# Patient Record
Sex: FEMALE | Race: WHITE | NOT HISPANIC OR LATINO | URBAN - METROPOLITAN AREA
[De-identification: names, ages, dates, MRNs, and addresses within clinical notes are randomized per-mention and may not be internally consistent; named-entity substitution may affect disease eponyms.]

---

## 2023-05-08 ENCOUNTER — TELEPHONE (OUTPATIENT)
Dept: PRIMARY CARE | Facility: CLINIC | Age: 53
End: 2023-05-08

## 2023-05-08 DIAGNOSIS — J32.9 SINUSITIS, UNSPECIFIED CHRONICITY, UNSPECIFIED LOCATION: ICD-10-CM

## 2023-05-08 RX ORDER — DICYCLOMINE HYDROCHLORIDE 20 MG/1
1 TABLET ORAL EVERY 6 HOURS
COMMUNITY
Start: 2021-09-16 | End: 2024-03-07 | Stop reason: ALTCHOICE

## 2023-05-08 RX ORDER — VALACYCLOVIR HYDROCHLORIDE 500 MG/1
1 TABLET, FILM COATED ORAL DAILY
COMMUNITY
Start: 2016-03-10

## 2023-05-08 RX ORDER — ESCITALOPRAM OXALATE 20 MG/1
1 TABLET ORAL DAILY
COMMUNITY
Start: 2020-10-27 | End: 2023-11-09 | Stop reason: SDUPTHER

## 2023-05-08 RX ORDER — LISINOPRIL 20 MG/1
TABLET ORAL
COMMUNITY
Start: 2017-02-11 | End: 2023-11-09 | Stop reason: SDUPTHER

## 2023-05-08 RX ORDER — METOPROLOL SUCCINATE 100 MG/1
1 TABLET, EXTENDED RELEASE ORAL DAILY
COMMUNITY
Start: 2018-10-12 | End: 2023-11-09 | Stop reason: SDUPTHER

## 2023-05-08 NOTE — TELEPHONE ENCOUNTER
Pt called to state that she has started a new job and has an sinus infection and at this time has not insurance, per pt discolored drainage, productive cough and is asking if something can be called in.    DA    [facility] [phone]      Per Dr Kacy lobo to call in rx for Doxycycline Hyclate 100 mg 1 capsule 2 times a day x 10 #20 no refill, rx called into the doctor line at [facility] and pt notified

## 2023-05-09 RX ORDER — DOXYCYCLINE 100 MG/1
100 CAPSULE ORAL 2 TIMES DAILY
Qty: 20 CAPSULE | Refills: 0 | OUTPATIENT
Start: 2023-05-09 | End: 2023-05-19

## 2023-11-09 ENCOUNTER — OFFICE VISIT (OUTPATIENT)
Dept: PRIMARY CARE | Facility: CLINIC | Age: 53
End: 2023-11-09
Payer: COMMERCIAL

## 2023-11-09 ENCOUNTER — LAB (OUTPATIENT)
Dept: LAB | Facility: LAB | Age: 53
End: 2023-11-09
Payer: COMMERCIAL

## 2023-11-09 VITALS
DIASTOLIC BLOOD PRESSURE: 100 MMHG | WEIGHT: 190 LBS | OXYGEN SATURATION: 96 % | TEMPERATURE: 97.8 F | BODY MASS INDEX: 29.32 KG/M2 | HEART RATE: 75 BPM | SYSTOLIC BLOOD PRESSURE: 180 MMHG

## 2023-11-09 DIAGNOSIS — I10 PRIMARY HYPERTENSION: Primary | ICD-10-CM

## 2023-11-09 DIAGNOSIS — I10 PRIMARY HYPERTENSION: ICD-10-CM

## 2023-11-09 LAB
25(OH)D3 SERPL-MCNC: 24 NG/ML (ref 30–100)
ALBUMIN SERPL BCP-MCNC: 4.5 G/DL (ref 3.4–5)
ALP SERPL-CCNC: 57 U/L (ref 33–110)
ALT SERPL W P-5'-P-CCNC: 20 U/L (ref 7–45)
ANION GAP SERPL CALC-SCNC: 12 MMOL/L (ref 10–20)
AST SERPL W P-5'-P-CCNC: 31 U/L (ref 9–39)
BILIRUB SERPL-MCNC: 0.6 MG/DL (ref 0–1.2)
BUN SERPL-MCNC: 11 MG/DL (ref 6–23)
CALCIUM SERPL-MCNC: 9.4 MG/DL (ref 8.6–10.3)
CHLORIDE SERPL-SCNC: 101 MMOL/L (ref 98–107)
CHOLEST SERPL-MCNC: 217 MG/DL (ref 0–199)
CHOLESTEROL/HDL RATIO: 1.7
CO2 SERPL-SCNC: 26 MMOL/L (ref 21–32)
CREAT SERPL-MCNC: 0.61 MG/DL (ref 0.5–1.05)
ERYTHROCYTE [DISTWIDTH] IN BLOOD BY AUTOMATED COUNT: 13.2 % (ref 11.5–14.5)
GFR SERPL CREATININE-BSD FRML MDRD: >90 ML/MIN/1.73M*2
GLUCOSE SERPL-MCNC: 78 MG/DL (ref 74–99)
HCT VFR BLD AUTO: 44 % (ref 36–46)
HDLC SERPL-MCNC: 124.6 MG/DL
HGB BLD-MCNC: 14.5 G/DL (ref 12–16)
LDLC SERPL CALC-MCNC: 84 MG/DL
MCH RBC QN AUTO: 33.3 PG (ref 26–34)
MCHC RBC AUTO-ENTMCNC: 33 G/DL (ref 32–36)
MCV RBC AUTO: 101 FL (ref 80–100)
NON HDL CHOLESTEROL: 92 MG/DL (ref 0–149)
NRBC BLD-RTO: 0 /100 WBCS (ref 0–0)
PLATELET # BLD AUTO: 270 X10*3/UL (ref 150–450)
POTASSIUM SERPL-SCNC: 4.4 MMOL/L (ref 3.5–5.3)
PROT SERPL-MCNC: 7.4 G/DL (ref 6.4–8.2)
RBC # BLD AUTO: 4.35 X10*6/UL (ref 4–5.2)
SODIUM SERPL-SCNC: 135 MMOL/L (ref 136–145)
TRIGL SERPL-MCNC: 42 MG/DL (ref 0–149)
TSH SERPL-ACNC: 0.7 MIU/L (ref 0.44–3.98)
VLDL: 8 MG/DL (ref 0–40)
WBC # BLD AUTO: 5.6 X10*3/UL (ref 4.4–11.3)

## 2023-11-09 PROCEDURE — 3080F DIAST BP >= 90 MM HG: CPT | Performed by: INTERNAL MEDICINE

## 2023-11-09 PROCEDURE — 85027 COMPLETE CBC AUTOMATED: CPT

## 2023-11-09 PROCEDURE — 80053 COMPREHEN METABOLIC PANEL: CPT

## 2023-11-09 PROCEDURE — 84443 ASSAY THYROID STIM HORMONE: CPT

## 2023-11-09 PROCEDURE — 3077F SYST BP >= 140 MM HG: CPT | Performed by: INTERNAL MEDICINE

## 2023-11-09 PROCEDURE — 82306 VITAMIN D 25 HYDROXY: CPT

## 2023-11-09 PROCEDURE — 1036F TOBACCO NON-USER: CPT | Performed by: INTERNAL MEDICINE

## 2023-11-09 PROCEDURE — 99214 OFFICE O/P EST MOD 30 MIN: CPT | Performed by: INTERNAL MEDICINE

## 2023-11-09 PROCEDURE — 36415 COLL VENOUS BLD VENIPUNCTURE: CPT

## 2023-11-09 PROCEDURE — 80061 LIPID PANEL: CPT

## 2023-11-09 PROCEDURE — 83036 HEMOGLOBIN GLYCOSYLATED A1C: CPT

## 2023-11-09 RX ORDER — ESCITALOPRAM OXALATE 20 MG/1
20 TABLET ORAL DAILY
Qty: 90 TABLET | Refills: 0 | Status: SHIPPED | OUTPATIENT
Start: 2023-11-09 | End: 2024-02-29 | Stop reason: SDUPTHER

## 2023-11-09 RX ORDER — METOPROLOL SUCCINATE 100 MG/1
100 TABLET, EXTENDED RELEASE ORAL DAILY
Qty: 90 TABLET | Refills: 0 | Status: SHIPPED | OUTPATIENT
Start: 2023-11-09 | End: 2024-03-07 | Stop reason: SDUPTHER

## 2023-11-09 RX ORDER — LISINOPRIL 20 MG/1
20 TABLET ORAL DAILY
Qty: 90 TABLET | Refills: 0 | Status: SHIPPED | OUTPATIENT
Start: 2023-11-09 | End: 2024-01-24 | Stop reason: SDUPTHER

## 2023-11-09 ASSESSMENT — PATIENT HEALTH QUESTIONNAIRE - PHQ9
SUM OF ALL RESPONSES TO PHQ9 QUESTIONS 1 AND 2: 0
1. LITTLE INTEREST OR PLEASURE IN DOING THINGS: NOT AT ALL
2. FEELING DOWN, DEPRESSED OR HOPELESS: NOT AT ALL

## 2023-11-09 ASSESSMENT — ENCOUNTER SYMPTOMS
OCCASIONAL FEELINGS OF UNSTEADINESS: 0
DEPRESSION: 1
LOSS OF SENSATION IN FEET: 0

## 2023-11-10 LAB
EST. AVERAGE GLUCOSE BLD GHB EST-MCNC: 108 MG/DL
HBA1C MFR BLD: 5.4 %

## 2023-11-13 ENCOUNTER — TELEPHONE (OUTPATIENT)
Dept: PRIMARY CARE | Facility: CLINIC | Age: 53
End: 2023-11-13
Payer: COMMERCIAL

## 2023-11-14 NOTE — PROGRESS NOTES
Pt wants a letter stating that he is not able to perform his job duties due to his  cardiac dx. He will be here to  tomorrow. Subjective   Patient ID: [id number]   [facility]    Tess Grant is a 53 y.o. female who presents for Hypertension. She was taking lisinopril and metoprolol for many years, she ran out of her medication  for 2 weeks and her BP is running high.        Objective   Visit Vitals  BP (!) 180/100 (BP Location: Left arm, Patient Position: Sitting, BP Cuff Size: Adult)   Pulse 75   Temp 36.6 °C (97.8 °F) (Skin)      Review of Systems  All 12 systems reviewed, no other abnormality except that mentioned in HPI.    Physical Exam  Constitutional- no abnormality  ENT- no abnormality  Neck- no swelling  CVS- normal S1 and S2, no murmur.  Pulmonary- clear to auscultation,no rhonchi, no wheezes.  Abdomen- normal- liver and spleen, soft, no distension, bowel sound present.  Neurological- all cranial nerves intact, speech and gait normal, no sensory or motor deficiency.  Musculoskeletal- all pulses are normal, normal movement, no joint swelling.  Skin- no rash, dry.  psychiatry- no suicidal ideation.  Lymph node- no lymphadenopathy      Assessment/Plan   Problem List Items Addressed This Visit    None  Visit Diagnoses       Primary hypertension    -  Primary    Relevant Medications    lisinopril 20 mg tablet    metoprolol succinate XL (Toprol-XL) 100 mg 24 hr tablet    escitalopram (Lexapro) 20 mg tablet    Other Relevant Orders    CBC    Comprehensive Metabolic Panel    Lipid Panel    TSH with reflex to Free T4 if abnormal    Vitamin D 25-Hydroxy,Total (for eval of Vitamin D levels)    Hemoglobin A1C        Her BP medication refilled and blood work ordered.    Jose Pena MD

## 2024-01-24 DIAGNOSIS — I10 PRIMARY HYPERTENSION: ICD-10-CM

## 2024-01-25 ENCOUNTER — TELEPHONE (OUTPATIENT)
Dept: PRIMARY CARE | Facility: CLINIC | Age: 54
End: 2024-01-25
Payer: COMMERCIAL

## 2024-01-25 RX ORDER — LISINOPRIL 20 MG/1
20 TABLET ORAL DAILY
Qty: 90 TABLET | Refills: 0 | Status: SHIPPED | OUTPATIENT
Start: 2024-01-25 | End: 2024-03-07 | Stop reason: DRUGHIGH

## 2024-01-25 NOTE — TELEPHONE ENCOUNTER
Gloria Pharmacist from [facility] calling to clarify the directions on the RX for pt's Lisinopril you sent in it says Lisinopril 20 mg take one every day, but in the directions it says take 1.5 tablets every day, how is pt supposed to be taking this?

## 2024-01-29 DIAGNOSIS — I10 PRIMARY HYPERTENSION: ICD-10-CM

## 2024-01-29 NOTE — TELEPHONE ENCOUNTER
Spoke with patient. She takes 1.5 daily. Called and relayed that to pharmacist Gloria. She will get that ready for patient today.     Patient is scheduled to see Dr. Murrieta on friday

## 2024-02-02 ENCOUNTER — APPOINTMENT (OUTPATIENT)
Dept: PRIMARY CARE | Facility: CLINIC | Age: 54
End: 2024-02-02
Payer: COMMERCIAL

## 2024-02-02 PROBLEM — K63.8219 SMALL INTESTINAL BACTERIAL OVERGROWTH: Status: ACTIVE | Noted: 2024-02-02

## 2024-02-02 PROBLEM — I10 ESSENTIAL HYPERTENSION: Status: ACTIVE | Noted: 2020-02-11

## 2024-02-02 PROBLEM — I82.409 DEEP VEIN THROMBOSIS (DVT) (MULTI): Status: ACTIVE | Noted: 2024-02-02

## 2024-02-02 PROBLEM — F33.9 RECURRENT DEPRESSIVE DISORDER (CMS-HCC): Status: ACTIVE | Noted: 2024-02-02

## 2024-02-02 PROBLEM — F17.210 CIGARETTE NICOTINE DEPENDENCE WITHOUT COMPLICATION: Status: ACTIVE | Noted: 2020-02-11

## 2024-02-02 NOTE — PROGRESS NOTES
Subjective   Patient ID: Tess Grant is a 53 y.o. female who presents for No chief complaint on file..    HTN   - previously on lisinopril & metoprolol     Last labwork completed 11/9/23   - HgB A1c 5.4%   - Vitamin D 24   - TSH WNL   - Lipids: Total 217, .6, LDL 84, TG 42  - CBC, CMP WNL     Hx of DVT     Recurrent depressive disorder     Tobacco Use     HM   Last Mamogram 4/25/22   Colonoscopy 11/18/21 - recall 10 years   Pap -       Objective   There were no vitals taken for this visit.    Physical Exam    Assessment/Plan   {Assess/PlanSmartLinks:72352}      Debby Murrieta,      I spent a total of *** minutes on the date of the service which included preparing to see the patient, face-to-face patient care, completing clinical documentation, performing a medically appropriate examination, counseling and educating the patient/family/caregiver and ordering medications, tests, or procedures.

## 2024-02-29 ENCOUNTER — TELEPHONE (OUTPATIENT)
Dept: PRIMARY CARE | Facility: CLINIC | Age: 54
End: 2024-02-29
Payer: COMMERCIAL

## 2024-02-29 DIAGNOSIS — I10 PRIMARY HYPERTENSION: ICD-10-CM

## 2024-02-29 RX ORDER — ESCITALOPRAM OXALATE 20 MG/1
20 TABLET ORAL DAILY
Qty: 90 TABLET | Refills: 0 | Status: SHIPPED | OUTPATIENT
Start: 2024-02-29 | End: 2024-03-07 | Stop reason: SDUPTHER

## 2024-03-07 ENCOUNTER — OFFICE VISIT (OUTPATIENT)
Dept: PRIMARY CARE | Facility: CLINIC | Age: 54
End: 2024-03-07
Payer: COMMERCIAL

## 2024-03-07 VITALS
OXYGEN SATURATION: 97 % | SYSTOLIC BLOOD PRESSURE: 158 MMHG | TEMPERATURE: 97.5 F | BODY MASS INDEX: 28.89 KG/M2 | HEART RATE: 74 BPM | WEIGHT: 187.2 LBS | DIASTOLIC BLOOD PRESSURE: 100 MMHG

## 2024-03-07 DIAGNOSIS — I10 ESSENTIAL HYPERTENSION: Primary | ICD-10-CM

## 2024-03-07 DIAGNOSIS — R61 NIGHT SWEATS: ICD-10-CM

## 2024-03-07 DIAGNOSIS — R23.2 HOT FLASHES: ICD-10-CM

## 2024-03-07 DIAGNOSIS — I10 PRIMARY HYPERTENSION: ICD-10-CM

## 2024-03-07 PROBLEM — I82.409 DEEP VEIN THROMBOSIS (DVT) (MULTI): Status: RESOLVED | Noted: 2024-02-02 | Resolved: 2024-03-07

## 2024-03-07 PROCEDURE — 3077F SYST BP >= 140 MM HG: CPT | Performed by: FAMILY MEDICINE

## 2024-03-07 PROCEDURE — 1036F TOBACCO NON-USER: CPT | Performed by: FAMILY MEDICINE

## 2024-03-07 PROCEDURE — 99214 OFFICE O/P EST MOD 30 MIN: CPT | Performed by: FAMILY MEDICINE

## 2024-03-07 PROCEDURE — 3080F DIAST BP >= 90 MM HG: CPT | Performed by: FAMILY MEDICINE

## 2024-03-07 RX ORDER — LISINOPRIL 20 MG/1
20 TABLET ORAL DAILY
Qty: 90 TABLET | Refills: 3 | Status: SHIPPED | OUTPATIENT
Start: 2024-03-07 | End: 2024-04-18 | Stop reason: SDUPTHER

## 2024-03-07 RX ORDER — VIT C/E/ZN/COPPR/LUTEIN/ZEAXAN 250MG-90MG
50 CAPSULE ORAL DAILY
COMMUNITY

## 2024-03-07 RX ORDER — CONJUGATED ESTROGENS AND MEDROXYPROGESTERONE ACETATE .3; 1.5 MG/1; MG/1
1 TABLET, SUGAR COATED ORAL DAILY
Qty: 90 TABLET | Refills: 3 | Status: SHIPPED | OUTPATIENT
Start: 2024-03-07 | End: 2025-03-07

## 2024-03-07 RX ORDER — METOPROLOL SUCCINATE 100 MG/1
100 TABLET, EXTENDED RELEASE ORAL DAILY
Qty: 90 TABLET | Refills: 2 | Status: SHIPPED | OUTPATIENT
Start: 2024-03-07

## 2024-03-07 RX ORDER — ESCITALOPRAM OXALATE 20 MG/1
20 TABLET ORAL DAILY
Qty: 90 TABLET | Refills: 2 | Status: SHIPPED | OUTPATIENT
Start: 2024-03-07

## 2024-03-07 ASSESSMENT — ENCOUNTER SYMPTOMS
DEPRESSION: 0
LOSS OF SENSATION IN FEET: 1
OCCASIONAL FEELINGS OF UNSTEADINESS: 0

## 2024-03-07 ASSESSMENT — PATIENT HEALTH QUESTIONNAIRE - PHQ9
1. LITTLE INTEREST OR PLEASURE IN DOING THINGS: NOT AT ALL
SUM OF ALL RESPONSES TO PHQ9 QUESTIONS 1 AND 2: 0
2. FEELING DOWN, DEPRESSED OR HOPELESS: NOT AT ALL

## 2024-03-07 NOTE — PROGRESS NOTES
Subjective   Patient ID: Tess Grant is a 53 y.o. female who presents for Hypertension.    HTN   - family history of htn on both sides   - BP at home 140/80   - lisinopril 30mg (1.5 tablet) and metoprolol 100mg     Hx of DVT   - declines hx of   Recurrent Depressive Disorder   - lexapro 20mg   - 5 years ago     LMP  - spotting for about 1 month   - period in January, short lived   Night sweats, hot flashes, brain fog, headaches     - has IUD;   Mirena   No family history breast     Labwork up to date 11/9/23   - A1C 5.4%   - vitamin D 24   TSH WNL   - Lipid Panel   Total 217, .6, LDL 84         Objective   BP (!) 158/100 (BP Location: Left arm, Patient Position: Sitting, BP Cuff Size: Adult)   Pulse 74   Temp 36.4 °C (97.5 °F) (Skin)   Wt 84.9 kg (187 lb 3.2 oz)   SpO2 97%   BMI 28.89 kg/m²     Physical Exam  Constitutional:       General: She is not in acute distress.     Appearance: Normal appearance. She is not ill-appearing, toxic-appearing or diaphoretic.   HENT:      Head: Normocephalic and atraumatic.   Eyes:      Extraocular Movements: Extraocular movements intact.      Pupils: Pupils are equal, round, and reactive to light.   Cardiovascular:      Rate and Rhythm: Normal rate and regular rhythm.      Pulses: Normal pulses.      Heart sounds: Normal heart sounds.   Pulmonary:      Effort: Pulmonary effort is normal.      Breath sounds: Normal breath sounds.   Neurological:      Mental Status: She is alert.         Assessment/Plan   Problem List Items Addressed This Visit             ICD-10-CM    Essential hypertension - Primary I10    Relevant Medications    lisinopril (PriniviL) 20 mg tablet     Other Visit Diagnoses         Codes    Hot flashes     R23.2    Relevant Medications    conj estrog-medroxyprogest ace (Prempro) 0.3-1.5 mg tablet    Night sweats     R61    Relevant Medications    conj estrog-medroxyprogest ace (Prempro) 0.3-1.5 mg tablet    Primary hypertension     I10    Relevant  Medications    escitalopram (Lexapro) 20 mg tablet    metoprolol succinate XL (Toprol-XL) 100 mg 24 hr tablet        The chart lists a history of DVT hwoever the patient states that she has never had a DVT; during chart review there isn't any information or documentation relating to DVT hx. I suspect it was erroneously put in the chart.   She also admits to significant white coat syndrome and will have perfectly normal blood pressure at home, but coming to the doctor makes her incredibly anxious.     She has been having significant menopausal symptoms that are interfering with daily life. Will trial estrogen/progesterone therapy; discussed risk factors and risk/benefits of estrogen therapy. Discussed anticipated length of treatment.         Debby Murrieta,      I spent a total of 30 minutes on the date of the service which included preparing to see the patient, face-to-face patient care, completing clinical documentation, performing a medically appropriate examination, counseling and educating the patient/family/caregiver and ordering medications, tests, or procedures.

## 2024-03-26 ENCOUNTER — OFFICE VISIT (OUTPATIENT)
Dept: PRIMARY CARE | Facility: CLINIC | Age: 54
End: 2024-03-26
Payer: COMMERCIAL

## 2024-03-26 VITALS
DIASTOLIC BLOOD PRESSURE: 89 MMHG | SYSTOLIC BLOOD PRESSURE: 148 MMHG | WEIGHT: 193.4 LBS | OXYGEN SATURATION: 96 % | BODY MASS INDEX: 29.84 KG/M2 | TEMPERATURE: 98 F | HEART RATE: 59 BPM

## 2024-03-26 DIAGNOSIS — J01.00 ACUTE NON-RECURRENT MAXILLARY SINUSITIS: Primary | ICD-10-CM

## 2024-03-26 PROCEDURE — 99213 OFFICE O/P EST LOW 20 MIN: CPT | Performed by: NURSE PRACTITIONER

## 2024-03-26 PROCEDURE — 3077F SYST BP >= 140 MM HG: CPT | Performed by: NURSE PRACTITIONER

## 2024-03-26 PROCEDURE — 1036F TOBACCO NON-USER: CPT | Performed by: NURSE PRACTITIONER

## 2024-03-26 PROCEDURE — 3079F DIAST BP 80-89 MM HG: CPT | Performed by: NURSE PRACTITIONER

## 2024-03-26 RX ORDER — AMOXICILLIN AND CLAVULANATE POTASSIUM 875; 125 MG/1; MG/1
875 TABLET, FILM COATED ORAL 2 TIMES DAILY
Qty: 14 TABLET | Refills: 0 | Status: SHIPPED | OUTPATIENT
Start: 2024-03-26

## 2024-03-26 ASSESSMENT — ENCOUNTER SYMPTOMS
NAUSEA: 0
HEADACHES: 1
VOICE CHANGE: 0
CHILLS: 1
FATIGUE: 0
DIARRHEA: 0
WHEEZING: 0
SHORTNESS OF BREATH: 0
ABDOMINAL PAIN: 0
VOMITING: 0
SORE THROAT: 0
FEVER: 0
COUGH: 1

## 2024-03-26 ASSESSMENT — PATIENT HEALTH QUESTIONNAIRE - PHQ9
SUM OF ALL RESPONSES TO PHQ9 QUESTIONS 1 AND 2: 0
2. FEELING DOWN, DEPRESSED OR HOPELESS: NOT AT ALL
1. LITTLE INTEREST OR PLEASURE IN DOING THINGS: NOT AT ALL

## 2024-03-26 NOTE — PROGRESS NOTES
Subjective   Tess Grant is a 53 y.o. female who presents for Sinusitis (Possible sinus infection, drainage headache, chills last couple of days/Covid test neg last night).    HPI  She presents to the office today with 4-5 days of symptoms.  Started about 5 days ago with headaches and runny nose.  (+) nasal congestion.  (+) bloody discharge  No fever but some chills.   No body aches.   (+) tooth pain on the left side.  More drainage noted from the left side.  No ear pain.  No sore throat   (+)dry cough- (+) PND  NO SOB or wheezing.   No GI symptoms.    Used to have a lot of sinus infections in the past.  Now has a runny nose.  Had been using the Neti pot and taking Advil     Review of Systems   Constitutional:  Positive for chills. Negative for fatigue and fever.   HENT:  Positive for congestion and postnasal drip. Negative for ear pain, sore throat and voice change.    Respiratory:  Positive for cough. Negative for shortness of breath and wheezing.    Gastrointestinal:  Negative for abdominal pain, diarrhea, nausea and vomiting.   Neurological:  Positive for headaches.       Objective   /89   Pulse 59   Temp 36.7 °C (98 °F)   Wt 87.7 kg (193 lb 6.4 oz)   SpO2 96%   BMI 29.84 kg/m²     Physical Exam  Constitutional:       General: She is not in acute distress.     Appearance: Normal appearance. She is not toxic-appearing.   HENT:      Right Ear: Tympanic membrane, ear canal and external ear normal.      Left Ear: Tympanic membrane, ear canal and external ear normal.      Nose:      Right Sinus: Maxillary sinus tenderness and frontal sinus tenderness present.      Left Sinus: Maxillary sinus tenderness and frontal sinus tenderness present.      Mouth/Throat:      Mouth: Mucous membranes are moist.      Pharynx: Oropharynx is clear. No oropharyngeal exudate or posterior oropharyngeal erythema.   Cardiovascular:      Rate and Rhythm: Normal rate and regular rhythm.      Heart sounds: Normal heart sounds,  S1 normal and S2 normal. No murmur heard.  Pulmonary:      Effort: Pulmonary effort is normal.      Breath sounds: Normal breath sounds and air entry. No wheezing.   Lymphadenopathy:      Cervical: No cervical adenopathy.   Neurological:      Mental Status: She is alert and oriented to person, place, and time.   Psychiatric:         Attention and Perception: Attention normal.         Mood and Affect: Mood and affect normal.         Behavior: Behavior is cooperative.         Thought Content: Thought content normal.         Judgment: Judgment normal.         Assessment/Plan   Problem List Items Addressed This Visit    None  Visit Diagnoses       Acute non-recurrent maxillary sinusitis    -  Primary    Relevant Medications    amoxicillin-pot clavulanate (Augmentin) 875-125 mg tablet            It has been a pleasure seeing you today!

## 2024-03-26 NOTE — PATIENT INSTRUCTIONS
Take the Augmentin as directed with food.  Mucinex for congestion.  Let me know if no improvement of symptoms.

## 2024-04-18 ENCOUNTER — TELEPHONE (OUTPATIENT)
Dept: PRIMARY CARE | Facility: CLINIC | Age: 54
End: 2024-04-18
Payer: COMMERCIAL

## 2024-04-18 DIAGNOSIS — I10 ESSENTIAL HYPERTENSION: ICD-10-CM

## 2024-04-18 RX ORDER — LISINOPRIL 20 MG/1
30 TABLET ORAL DAILY
Qty: 135 TABLET | Refills: 3 | Status: SHIPPED | OUTPATIENT
Start: 2024-04-18 | End: 2025-04-18

## 2024-04-18 NOTE — TELEPHONE ENCOUNTER
Pt left a msg asking for a refill of her Lisinopril.  She said that Dr. Murrieta increased it to take 2 tabs once daily. Can you clean up the directions as there are 2 on it already.  Pharm is [facility].

## 2024-06-24 ENCOUNTER — APPOINTMENT (OUTPATIENT)
Dept: PRIMARY CARE | Facility: CLINIC | Age: 54
End: 2024-06-24
Payer: COMMERCIAL

## 2024-06-24 VITALS
TEMPERATURE: 97.5 F | DIASTOLIC BLOOD PRESSURE: 90 MMHG | OXYGEN SATURATION: 96 % | SYSTOLIC BLOOD PRESSURE: 150 MMHG | HEART RATE: 68 BPM | WEIGHT: 187.2 LBS | BODY MASS INDEX: 28.89 KG/M2

## 2024-06-24 DIAGNOSIS — Z12.4 CERVICAL CANCER SCREENING: Primary | ICD-10-CM

## 2024-06-24 DIAGNOSIS — R23.2 HOT FLASHES: ICD-10-CM

## 2024-06-24 PROCEDURE — 3080F DIAST BP >= 90 MM HG: CPT | Performed by: FAMILY MEDICINE

## 2024-06-24 PROCEDURE — 87624 HPV HI-RISK TYP POOLED RSLT: CPT

## 2024-06-24 PROCEDURE — 3077F SYST BP >= 140 MM HG: CPT | Performed by: FAMILY MEDICINE

## 2024-06-24 PROCEDURE — 88175 CYTOPATH C/V AUTO FLUID REDO: CPT

## 2024-06-24 PROCEDURE — 99214 OFFICE O/P EST MOD 30 MIN: CPT | Performed by: FAMILY MEDICINE

## 2024-06-24 ASSESSMENT — ENCOUNTER SYMPTOMS
DEPRESSION: 0
LOSS OF SENSATION IN FEET: 0
OCCASIONAL FEELINGS OF UNSTEADINESS: 0

## 2024-06-24 NOTE — PROGRESS NOTES
Subjective   Patient ID: Tess Grant is a 53 y.o. female who presents for IUD Removal.    Premrpo is working; symptoms had been much improved w/ treatment.   Not as much fluctuating moods so much   Hot flashes are nonexistant.     Objective   /90 (BP Location: Left arm, Patient Position: Sitting, BP Cuff Size: Adult)   Pulse 68   Temp 36.4 °C (97.5 °F) (Skin)   Wt 84.9 kg (187 lb 3.2 oz)   SpO2 96%   BMI 28.89 kg/m²     Physical Exam  Constitutional:       Appearance: Normal appearance. She is normal weight.   HENT:      Head: Normocephalic and atraumatic.      Nose: Nose normal.      Mouth/Throat:      Mouth: Mucous membranes are moist.      Pharynx: Oropharynx is clear.   Eyes:      Extraocular Movements: Extraocular movements intact.      Conjunctiva/sclera: Conjunctivae normal.      Pupils: Pupils are equal, round, and reactive to light.   Pulmonary:      Effort: Pulmonary effort is normal.   Genitourinary:     Vagina: Normal.      Cervix: Normal.      Uterus: Normal.    Neurological:      General: No focal deficit present.      Mental Status: She is alert.   Psychiatric:         Mood and Affect: Mood normal.         Assessment/Plan   Diagnoses and all orders for this visit:  Cervical cancer screening  -     THINPREP PAP TEST  -     HPV DNA High Risk With Genotype  Hot flashes  - continue w/ HRT       DO DEEPAK Agarwal spent a total of 31 minutes on the date of the service which included preparing to see the patient, face-to-face patient care, completing clinical documentation, performing a medically appropriate examination, counseling and educating the patient/family/caregiver and ordering medications, tests, or procedures.

## 2024-07-01 LAB
CYTOLOGY CMNT CVX/VAG CYTO-IMP: NORMAL
HPV HR 12 DNA GENITAL QL NAA+PROBE: NEGATIVE
HPV HR GENOTYPES PNL CVX NAA+PROBE: NEGATIVE
HPV16 DNA SPEC QL NAA+PROBE: NEGATIVE
HPV18 DNA SPEC QL NAA+PROBE: NEGATIVE
LAB AP HPV GENOTYPE QUESTION: YES
LAB AP HPV HR: NORMAL
LABORATORY COMMENT REPORT: NORMAL
PATH REPORT.TOTAL CANCER: NORMAL

## 2024-07-05 ENCOUNTER — TELEPHONE (OUTPATIENT)
Dept: PRIMARY CARE | Facility: CLINIC | Age: 54
End: 2024-07-05
Payer: COMMERCIAL

## 2024-07-05 NOTE — TELEPHONE ENCOUNTER
----- Message from Debby Murrieta DO sent at 7/3/2024  2:49 PM EDT -----  Please call patient with the following results:    Pap is normal and HPV Negative.   Repeat in 5 years.

## 2024-10-08 ENCOUNTER — PATIENT OUTREACH (OUTPATIENT)
Dept: PRIMARY CARE | Facility: CLINIC | Age: 54
End: 2024-10-08
Payer: COMMERCIAL

## 2024-10-08 DIAGNOSIS — Z12.31 ENCOUNTER FOR SCREENING MAMMOGRAM FOR BREAST CANCER: ICD-10-CM

## 2024-10-17 ENCOUNTER — HOSPITAL ENCOUNTER (OUTPATIENT)
Dept: RADIOLOGY | Facility: CLINIC | Age: 54
Discharge: HOME | End: 2024-10-17
Payer: COMMERCIAL

## 2024-10-17 VITALS — HEIGHT: 67 IN | BODY MASS INDEX: 30.13 KG/M2 | WEIGHT: 192 LBS

## 2024-10-17 DIAGNOSIS — Z12.31 ENCOUNTER FOR SCREENING MAMMOGRAM FOR BREAST CANCER: ICD-10-CM

## 2024-10-17 PROCEDURE — 77067 SCR MAMMO BI INCL CAD: CPT

## 2024-10-29 ENCOUNTER — HOSPITAL ENCOUNTER (OUTPATIENT)
Dept: RADIOLOGY | Facility: EXTERNAL LOCATION | Age: 54
Discharge: HOME | End: 2024-10-29

## 2025-01-14 ENCOUNTER — OFFICE VISIT (OUTPATIENT)
Dept: PRIMARY CARE | Facility: CLINIC | Age: 55
End: 2025-01-14
Payer: COMMERCIAL

## 2025-01-14 VITALS
HEART RATE: 88 BPM | OXYGEN SATURATION: 98 % | DIASTOLIC BLOOD PRESSURE: 102 MMHG | TEMPERATURE: 97.8 F | SYSTOLIC BLOOD PRESSURE: 182 MMHG | WEIGHT: 179.8 LBS | BODY MASS INDEX: 28.16 KG/M2

## 2025-01-14 DIAGNOSIS — J01.00 ACUTE NON-RECURRENT MAXILLARY SINUSITIS: Primary | ICD-10-CM

## 2025-01-14 DIAGNOSIS — I10 ESSENTIAL HYPERTENSION: ICD-10-CM

## 2025-01-14 PROCEDURE — 3077F SYST BP >= 140 MM HG: CPT | Performed by: NURSE PRACTITIONER

## 2025-01-14 PROCEDURE — 1036F TOBACCO NON-USER: CPT | Performed by: NURSE PRACTITIONER

## 2025-01-14 PROCEDURE — 99214 OFFICE O/P EST MOD 30 MIN: CPT | Performed by: NURSE PRACTITIONER

## 2025-01-14 PROCEDURE — 3080F DIAST BP >= 90 MM HG: CPT | Performed by: NURSE PRACTITIONER

## 2025-01-14 RX ORDER — AMOXICILLIN AND CLAVULANATE POTASSIUM 875; 125 MG/1; MG/1
875 TABLET, FILM COATED ORAL 2 TIMES DAILY
Qty: 14 TABLET | Refills: 0 | Status: SHIPPED | OUTPATIENT
Start: 2025-01-14

## 2025-01-14 RX ORDER — METOPROLOL SUCCINATE 100 MG/1
100 TABLET, EXTENDED RELEASE ORAL DAILY
Qty: 90 TABLET | Refills: 0 | Status: SHIPPED | OUTPATIENT
Start: 2025-01-14

## 2025-01-14 ASSESSMENT — ENCOUNTER SYMPTOMS
HEADACHES: 0
SHORTNESS OF BREATH: 0
ABDOMINAL PAIN: 0
WHEEZING: 0
SINUS PRESSURE: 1
DIARRHEA: 0
RHINORRHEA: 0
FEVER: 0
FATIGUE: 1
CHILLS: 0
WEAKNESS: 0
NAUSEA: 0
SINUS PAIN: 1
VOMITING: 0
PALPITATIONS: 0
VOICE CHANGE: 1
COUGH: 1
DIZZINESS: 0
SORE THROAT: 0
NUMBNESS: 0

## 2025-01-14 ASSESSMENT — PATIENT HEALTH QUESTIONNAIRE - PHQ9
2. FEELING DOWN, DEPRESSED OR HOPELESS: NOT AT ALL
1. LITTLE INTEREST OR PLEASURE IN DOING THINGS: NOT AT ALL
SUM OF ALL RESPONSES TO PHQ9 QUESTIONS 1 AND 2: 0

## 2025-01-14 NOTE — ASSESSMENT & PLAN NOTE
Asymptomatic.  Elevated today. Check home BP readings and follow up in one month.  Send home BP readings in a couple days.  Advised to ER if symptomatic.

## 2025-01-14 NOTE — PROGRESS NOTES
Subjective    Tess Grant is a 54 y.o. female who presents for URI (Started yesterday. Pt has a current Head and sinus congestion, cough, body aches and nasal post-drip. Denies fever and chills. ).    URI   Associated symptoms include congestion, coughing and sinus pain. Pertinent negatives include no abdominal pain, chest pain, diarrhea, ear pain, headaches, nausea, rhinorrhea, sore throat, vomiting or wheezing.     She presents to the office today for evaluation of symptoms since Wednesday and Thursday.   (+) nasal congestion and drainage since last week.  (+) blood out of both nares  No fever or chills.  Over the past couple of days has developed a lot of pressure in cheeks and head (R>L)  (+) PND  (+) scratchy throat with mild cough  (+) right teeth aching  No ear pain.  (+) slight body aches last night.  No new GI issues.  Has taken Ibuprofen.    Her blood pressure is elevated today.  She reports she has been taking her medication regularly.  She reports it tends to be higher in the office than at home due to anxiety.  She does have a way to check her blood pressure at home.  No chest pain, shortness breath, palpitations.  No headaches, numbness, tingling, weakness, dizziness or vision changes noted today.    Review of Systems   Constitutional:  Positive for fatigue. Negative for chills and fever.   HENT:  Positive for congestion, postnasal drip, sinus pressure, sinus pain and voice change. Negative for ear pain, rhinorrhea and sore throat.    Respiratory:  Positive for cough. Negative for shortness of breath and wheezing.    Cardiovascular:  Negative for chest pain, palpitations and leg swelling.   Gastrointestinal:  Negative for abdominal pain, diarrhea, nausea and vomiting.   Neurological:  Negative for dizziness, weakness, numbness and headaches.     Objective   BP (!) 182/102 (BP Location: Left arm, Patient Position: Sitting)   Pulse 88   Temp 36.6 °C (97.8 °F) (Oral)   Wt 81.6 kg (179 lb 12.8 oz)    SpO2 98%   BMI 28.16 kg/m²     Physical Exam  Constitutional:       General: She is not in acute distress.     Appearance: Normal appearance. She is not toxic-appearing.   HENT:      Right Ear: Tympanic membrane, ear canal and external ear normal.      Left Ear: Tympanic membrane, ear canal and external ear normal.      Nose:      Right Sinus: Maxillary sinus tenderness present. No frontal sinus tenderness.      Left Sinus: No maxillary sinus tenderness or frontal sinus tenderness.      Mouth/Throat:      Mouth: Mucous membranes are moist.      Pharynx: Oropharynx is clear. No oropharyngeal exudate or posterior oropharyngeal erythema.   Cardiovascular:      Rate and Rhythm: Normal rate and regular rhythm.      Heart sounds: Normal heart sounds, S1 normal and S2 normal. No murmur heard.  Pulmonary:      Effort: Pulmonary effort is normal.      Breath sounds: Normal breath sounds and air entry. No wheezing.   Lymphadenopathy:      Cervical: No cervical adenopathy.   Neurological:      Mental Status: She is alert and oriented to person, place, and time.   Psychiatric:         Attention and Perception: Attention normal.         Mood and Affect: Mood and affect normal.         Behavior: Behavior is cooperative.         Thought Content: Thought content normal.         Judgment: Judgment normal.         Assessment/Plan   Problem List Items Addressed This Visit       Essential hypertension     Asymptomatic.  Elevated today. Check home BP readings and follow up in one month.  Send home BP readings in a couple days.  Advised to ER if symptomatic.         Relevant Medications    metoprolol succinate XL (Toprol-XL) 100 mg 24 hr tablet     Other Visit Diagnoses       Acute non-recurrent maxillary sinusitis    -  Primary    Relevant Medications    amoxicillin-pot clavulanate (Augmentin) 875-125 mg tablet        Augmentin as directed with food.  Advised to avoid decongestant use.  She can take Mucinex as needed for congestion.   She should let me know if symptoms do not improve or should become worse.    It has been a pleasure seeing you today!

## 2025-01-14 NOTE — PATIENT INSTRUCTIONS
Take the Augmentin with food as directed.  Avoids decongestants.  Mucinex for congestion.  Check BP at home.   Follow up in 1 month for BP check.

## 2025-01-28 ENCOUNTER — DOCUMENTATION (OUTPATIENT)
Dept: PRIMARY CARE | Facility: CLINIC | Age: 55
End: 2025-01-28
Payer: COMMERCIAL

## 2025-01-28 NOTE — PROGRESS NOTES
Pt was admitted to the hospital.   They have no beds.     She went in for Chest Pain & headaches   She has been compliant w/ lisinopril & metoprolol     CBC   CMP   Mag   Troponin   Negatiev    EKG normal     CT brain - normal     Vitals: 173/103       Essentially ED provider did not have any concerning findings while pt was in the ED and was concerned reegarding having patient board in the ED w/o beds.   Based on the information that was provided to me (no access to their EMR, lab results, etc...)  Pt qualifies for a diagnosis of Hypertensive Emergency and it is vital to r/o stroke based on symptoms of headache.   Pt had CT head w/o contrast that showed no concerning findings. Provider was unsure if the pt was continuing on HRT therapy as I have recorded in the pt's medical record which would increase her risk of thromboembolic disease.   We discussed increasing metoprolol to 100mg daily and lisinopril to 40mg daily.   Provider considering ordering Ctv to rule out thromboembolic disease.     Ultimately determined that if the ED provider felt like it was appropriate to discharge we could arrange for a close follow-up when the patient calls.

## 2025-01-31 ENCOUNTER — TELEPHONE (OUTPATIENT)
Dept: PRIMARY CARE | Facility: CLINIC | Age: 55
End: 2025-01-31
Payer: COMMERCIAL

## 2025-01-31 NOTE — TELEPHONE ENCOUNTER
Patient is an inpatient in [facility].  She had an aneurism.  They have placed a mesh around it.  She is wondering about the dosage of her metoprolol.  She thought she was to take 50mg daily but it was sent in last time for 100mg.  Did Dia increase or did we make a mistake in the prescription.  She will be discharged soon and wants this discussed beforehand..

## 2025-02-03 ENCOUNTER — PATIENT OUTREACH (OUTPATIENT)
Dept: PRIMARY CARE | Facility: CLINIC | Age: 55
End: 2025-02-03
Payer: COMMERCIAL

## 2025-02-03 NOTE — PROGRESS NOTES
Discharge Facility: [facility]  Discharge Diagnosis: Chest pian, unspecified type   Admission Date: 1/28/25  Discharge Date:  2/1/25    PCP Appointment Date: 2/12/25  Specialist Appointment Date: Patient to schedule with Neuro surgery for 4 weeks.   Hospital Encounter and Summary Linked: No, outside source. Linked to this encounter.   See discharge assessment below for further details    Engagement  Call Start Time: 1203 (Call completed with Etss) (2/3/2025 12:03 PM)    Medications  Medications reviewed with patient/caregiver?: Yes (2/3/2025 12:03 PM)  Is the patient having any side effects they believe may be caused by any medication additions or changes?: No (2/3/2025 12:03 PM)  Does the patient have all medications ordered at discharge?: Yes (2/3/2025 12:03 PM)  Care Management Interventions: No intervention needed (2/3/2025 12:03 PM)  Prescription Comments: lisinopril 40 mg tablet    Take 1 tablet by mouth once daily.  Start taking on: February 2, 2025     metoprolol succinate ER 25 mg 24 hr tablet  Take 1 tablet by mouth once daily.  Start taking on: February 2, 2025 (2/3/2025 12:03 PM)  Is the patient taking all medications as directed (includes completed medication regime)?: Yes (2/3/2025 12:03 PM)  Medication Comments: Patient verbalized all medicaiton changes (2/3/2025 12:03 PM)    Appointments  Does the patient have a primary care provider?: Yes (2/3/2025 12:03 PM)  Care Management Interventions: Verified appointment date/time/provider (2/3/2025 12:03 PM)  Has the patient kept scheduled appointments due by today?: Yes (2/3/2025 12:03 PM)  Care Management Interventions: Advised patient to keep appointment; Advised to schedule with specialist (2/3/2025 12:03 PM)    Self Management  What is the home health agency?: n/a (2/3/2025 12:03 PM)  Has home health visited the patient within 72 hours of discharge?: Not applicable (2/3/2025 12:03 PM)  What Durable Medical Equipment (DME) was ordered?: n/a  (2/3/2025 12:03 PM)    Patient Teaching  Does the patient have access to their discharge instructions?: Yes (2/3/2025 12:03 PM)  Care Management Interventions: Reviewed instructions with patient (2/3/2025 12:03 PM)  What is the patient's perception of their health status since discharge?: Improving (2/3/2025 12:03 PM)  Is the patient/caregiver able to teach back the hierarchy of who to call/visit for symptoms/problems? PCP, Specialist, Home Health nurse, Urgent Care, ED, 911: Yes (2/3/2025 12:03 PM)  Patient/Caregiver Education Comments: Patient reports she is doing well at home. She is feeling much, much better. She denies any questions/concners at this time. (2/3/2025 12:03 PM)

## 2025-02-04 ENCOUNTER — PATIENT OUTREACH (OUTPATIENT)
Dept: PRIMARY CARE | Facility: CLINIC | Age: 55
End: 2025-02-04
Payer: COMMERCIAL

## 2025-02-05 ENCOUNTER — DOCUMENTATION (OUTPATIENT)
Dept: PRIMARY CARE | Facility: CLINIC | Age: 55
End: 2025-02-05
Payer: COMMERCIAL

## 2025-02-05 DIAGNOSIS — F41.1 GAD (GENERALIZED ANXIETY DISORDER): Primary | ICD-10-CM

## 2025-02-05 DIAGNOSIS — I10 PRIMARY HYPERTENSION: Primary | ICD-10-CM

## 2025-02-05 RX ORDER — HYDROXYZINE HYDROCHLORIDE 25 MG/1
25 TABLET, FILM COATED ORAL EVERY 4 HOURS PRN
Qty: 60 TABLET | Refills: 0 | Status: SHIPPED | OUTPATIENT
Start: 2025-02-05 | End: 2025-03-07

## 2025-02-05 RX ORDER — HYDROCHLOROTHIAZIDE 25 MG/1
25 TABLET ORAL DAILY
Qty: 30 TABLET | Refills: 0 | Status: SHIPPED | OUTPATIENT
Start: 2025-02-05 | End: 2025-03-07

## 2025-02-05 RX ORDER — AMLODIPINE BESYLATE 10 MG/1
10 TABLET ORAL DAILY
Qty: 90 TABLET | Refills: 1 | Status: SHIPPED | OUTPATIENT
Start: 2025-02-05 | End: 2025-08-04

## 2025-02-05 NOTE — PROGRESS NOTES
Called the patient back regarding hypertension especially in setting in aneursym     Hydrochlorthiazide taken after our last phone call.   205/119     Amlodipine taken now; (1:12pm)  Will recheck bp in 1 hour     Will also start anti-anxiety / prn med (atarax)  Pt to  med and take it.   Discussed leobardo tif she has any symptoms she is to go to the emergency department asap.     Called patient 3:48pm   Blood pressure at 123/90    Will continue w/ bp medications as such   Tomorrow AM   Amlodipine 10mg   Lisinopril 40mg  Toprol 25mg    If necessary will continue w/ hydrochlorothiazide in evening if blood pressure remains elevated.

## 2025-02-05 NOTE — PROGRESS NOTES
Patient called to tell me her BP is elevated again this morning.   201/119 HR - 64     She took her routine meds, Lisinopril 40mg and Toprol XL 25mg     Advised her to return to the ED if it continues to elevate, and she feels worse

## 2025-02-05 NOTE — PROGRESS NOTES
Patient on the phone regarding her blood pressure which has been ranging from 1  systolic with recent repair of saccular brain aneurysm.  We discussed initiating additional medication I sent over amlodipine 10 mg in addition to HCTZ 25 mg.  She is instructed to  the amlodipine ASAP take a dose and then repeat her blood pressure within 30 minutes to an hour of taking the dose.  If her blood pressure remains high she is to initiate the hydrochlorothiazide and repeat the same plan by taking her blood pressure after 30 minutes to an hour.  If her blood pressure remains elevated despite the addition of these medications she is to seek care at the emergency department.

## 2025-02-05 NOTE — TELEPHONE ENCOUNTER
Dr. Murrieta spoke with patient and a follow up appointment has been scheduled   I performed a face to face evaluation of this patient and performed a full history and physical examination on the patient.  I agree with the resident's history, physical examination, and plan of the patient.

## 2025-02-12 ENCOUNTER — APPOINTMENT (OUTPATIENT)
Dept: PRIMARY CARE | Facility: CLINIC | Age: 55
End: 2025-02-12
Payer: COMMERCIAL

## 2025-02-18 ENCOUNTER — PATIENT OUTREACH (OUTPATIENT)
Dept: PRIMARY CARE | Facility: CLINIC | Age: 55
End: 2025-02-18
Payer: COMMERCIAL

## 2025-02-18 ENCOUNTER — DOCUMENTATION (OUTPATIENT)
Dept: PRIMARY CARE | Facility: CLINIC | Age: 55
End: 2025-02-18
Payer: COMMERCIAL

## 2025-02-18 DIAGNOSIS — R60.0 LOWER EXTREMITY EDEMA: Primary | ICD-10-CM

## 2025-02-18 NOTE — PROGRESS NOTES
Pt called due to bilateral lower extremity edema that started 45 minutes ago       Socks leaving marks in ankles   No indentations / marks in thighs   Legs are mostly puffy     Ankles and knees are red.    Hands, arms and face are red     Happened rapidly     Ended up doing video chat w/ the patient to evaluate   No fever   No joint pain   Does have a headache   systolic.     Discussed with patient getting stat labs now. [facility] heading [facility] to have [facility].   Will eval w/ labwork and then determine next best steps.   Possibly side effect from amlodipine? May need to dc and start hydrochlorothiazide again

## 2025-02-18 NOTE — PROGRESS NOTES
"Call regarding appt. with PCP n/a  follow up is 2/20/25.     Patient called and states that she woke up this morning with red, hot swollen legs. She states its above her knee and down to her ankles. She has been unable to elevate them due to being at work. . She reports that her ankles are \"busting\". She denies fever.   "

## 2025-02-19 NOTE — PROGRESS NOTES
This nurse called patient to do a follow up from Dr. Murrieta's orders 2/18/25. Patient went to the lab before 5pm. She states that because she had not been there previously they would not be able to do the lab work. She would need to have papers picked up from the office. (She is unsure of the papers). She states the edema is down, but she has just woke up. Her plan this morning is to gather the necessary paperwork to return to the lab. Advised patient to call with any concerns she may have.

## 2025-02-19 NOTE — PROGRESS NOTES
Patient called back to say she is feeling better, headache is better, but not gone. The swelling is down. And her blood pressure is better. She said yesterday was just an off day. She would like to know if she can have her labs drawn at the office visit tomorrow, instead of going through [facility] today.

## 2025-02-20 ENCOUNTER — APPOINTMENT (OUTPATIENT)
Dept: PRIMARY CARE | Facility: CLINIC | Age: 55
End: 2025-02-20
Payer: COMMERCIAL

## 2025-02-20 VITALS
OXYGEN SATURATION: 99 % | SYSTOLIC BLOOD PRESSURE: 140 MMHG | HEART RATE: 88 BPM | TEMPERATURE: 97.4 F | BODY MASS INDEX: 28.29 KG/M2 | DIASTOLIC BLOOD PRESSURE: 102 MMHG | WEIGHT: 180.6 LBS

## 2025-02-20 DIAGNOSIS — F33.9 RECURRENT DEPRESSIVE DISORDER (CMS-HCC): ICD-10-CM

## 2025-02-20 DIAGNOSIS — R51.9 BILATERAL HEADACHES: ICD-10-CM

## 2025-02-20 DIAGNOSIS — F17.291 OTHER TOBACCO PRODUCT NICOTINE DEPENDENCE IN REMISSION: ICD-10-CM

## 2025-02-20 DIAGNOSIS — I10 ESSENTIAL HYPERTENSION: Primary | ICD-10-CM

## 2025-02-20 PROBLEM — F17.201 NICOTINE DEPENDENCE IN REMISSION: Status: RESOLVED | Noted: 2025-02-20 | Resolved: 2025-02-20

## 2025-02-20 PROBLEM — F17.210 CIGARETTE NICOTINE DEPENDENCE WITHOUT COMPLICATION: Status: RESOLVED | Noted: 2020-02-11 | Resolved: 2025-02-20

## 2025-02-20 PROBLEM — F17.201 NICOTINE DEPENDENCE IN REMISSION: Status: ACTIVE | Noted: 2025-02-20

## 2025-02-20 PROCEDURE — 99214 OFFICE O/P EST MOD 30 MIN: CPT | Performed by: FAMILY MEDICINE

## 2025-02-20 PROCEDURE — 1036F TOBACCO NON-USER: CPT | Performed by: FAMILY MEDICINE

## 2025-02-20 PROCEDURE — 3080F DIAST BP >= 90 MM HG: CPT | Performed by: FAMILY MEDICINE

## 2025-02-20 PROCEDURE — 3077F SYST BP >= 140 MM HG: CPT | Performed by: FAMILY MEDICINE

## 2025-02-20 RX ORDER — HYDROCHLOROTHIAZIDE 50 MG/1
50 TABLET ORAL DAILY
Qty: 90 TABLET | Refills: 3 | Status: SHIPPED | OUTPATIENT
Start: 2025-02-20 | End: 2026-02-20

## 2025-02-20 RX ORDER — LISINOPRIL 40 MG/1
1 TABLET ORAL
COMMUNITY
Start: 2025-02-01

## 2025-02-20 RX ORDER — METOPROLOL SUCCINATE 25 MG/1
1 TABLET, EXTENDED RELEASE ORAL
COMMUNITY
Start: 2025-02-01

## 2025-02-20 ASSESSMENT — PATIENT HEALTH QUESTIONNAIRE - PHQ9
2. FEELING DOWN, DEPRESSED OR HOPELESS: NOT AT ALL
SUM OF ALL RESPONSES TO PHQ9 QUESTIONS 1 AND 2: 0
1. LITTLE INTEREST OR PLEASURE IN DOING THINGS: NOT AT ALL

## 2025-02-20 ASSESSMENT — ENCOUNTER SYMPTOMS
DEPRESSION: 0
OCCASIONAL FEELINGS OF UNSTEADINESS: 0
LOSS OF SENSATION IN FEET: 0

## 2025-02-20 NOTE — PROGRESS NOTES
"Assessment/Plan     Discontinue amlodipine;   Increase hydrochlorothiazide to 50mg from 25mg     Pt w/ continued high blood pressure; morning headaches (could be from nicotine withdrawal vs vinicio?) and daily fatigue will evaluate for VINICIO (Stop bang score = 4).     Follow up in 8 weeks.     Problem List Items Addressed This Visit       Essential hypertension - Primary    Relevant Medications    hydroCHLOROthiazide (HYDRODiuril) 50 mg tablet    Other Relevant Orders    Home sleep apnea test (HSAT)    Recurrent depressive disorder (CMS-HCC)    RESOLVED: Nicotine dependence in remission     Other Visit Diagnoses       Bilateral headaches        Relevant Orders    Home sleep apnea test (HSAT)              Subjective   Patient ID: Tess Grant is a 54 y.o. female who presents for Hospital Follow-up.    Quit vaping nicotine after hospitalization  9% nicotine       S - Snoring: Do you snore loudly (louder than talking or loud enough to be heard through closed doors)? 1  T - Tired: Do you often feel tired, fatigued, or sleepy during daytime? 1   O - Observed: Has anyone observed you stop breathing during your sleep? 0   P - Pressure: Do you have or are you being treated for high blood pressure? 1  B - BMI: Is your body mass index (BMI) greater than 35? (This is calculated by dividing weight in kilograms by the square of height in meters.) 0  A - Age: Are you over 50 years old? 1  N - Neck: Is your neck circumference greater than 40 cm (15.75 inches)? 0  G - Gender: Are you male? 0  Scoring:  Each \"Yes\" answer is worth 1 point. The higher the score, the greater the likelihood that the individual has obstructive sleep apnea.    0-2 points: Low risk of sleep apnea  3-4 points: Intermediate risk  5-8 points: High risk   Score: 4 Intermediate risk     Objective   BP (!) 140/102 (BP Location: Left arm, Patient Position: Sitting, BP Cuff Size: Adult)   Pulse 88   Temp 36.3 °C (97.4 °F) (Skin)   Wt 81.9 kg (180 lb 9.6 oz)   SpO2 " 99%   BMI 28.29 kg/m²     Physical Exam:     Constitutional:   General: not in acute distress  Appearance: normal appearance, non-toxic, not ill-appearing or diaphoretic.   HENT:   Head: Normocephalic and atraumatic  Eyes: EOMI, PERRL  Neuro: CN II-XII Intact, alert, oriented x3          DO DEEPAK Agarwal spent a total of 30 minutes on the date of the service which included preparing to see the patient, face-to-face patient care, completing clinical documentation, performing a medically appropriate examination, counseling and educating the patient/family/caregiver and ordering medications, tests, or procedures.

## 2025-03-03 ENCOUNTER — TELEPHONE (OUTPATIENT)
Dept: PRIMARY CARE | Facility: CLINIC | Age: 55
End: 2025-03-03
Payer: COMMERCIAL

## 2025-03-03 ENCOUNTER — PATIENT OUTREACH (OUTPATIENT)
Dept: PRIMARY CARE | Facility: CLINIC | Age: 55
End: 2025-03-03
Payer: COMMERCIAL

## 2025-03-03 DIAGNOSIS — I10 PRIMARY HYPERTENSION: ICD-10-CM

## 2025-03-03 DIAGNOSIS — B00.9 HSV INFECTION: Primary | ICD-10-CM

## 2025-03-03 NOTE — TELEPHONE ENCOUNTER
Pt called, lvm, she was at the ED 2/27/25 for a UTI and allergic reaction to the lisinopril. Pt is no longer on Lisinopril. Pt just wanted to update you.

## 2025-03-04 NOTE — TELEPHONE ENCOUNTER
Patient requesting you look at her med list and tell her exactly what she is supposed to be taking because she is very confused about them.

## 2025-03-05 RX ORDER — METOPROLOL SUCCINATE 50 MG/1
50 TABLET, EXTENDED RELEASE ORAL DAILY
Qty: 30 TABLET | Refills: 5 | Status: SHIPPED | OUTPATIENT
Start: 2025-03-05 | End: 2025-09-01

## 2025-03-05 RX ORDER — VALACYCLOVIR HYDROCHLORIDE 500 MG/1
500 TABLET, FILM COATED ORAL DAILY
Qty: 30 TABLET | Refills: 11 | Status: SHIPPED | OUTPATIENT
Start: 2025-03-05 | End: 2026-03-05

## 2025-03-05 RX ORDER — ESCITALOPRAM OXALATE 20 MG/1
20 TABLET ORAL DAILY
Qty: 90 TABLET | Refills: 2 | Status: SHIPPED | OUTPATIENT
Start: 2025-03-05

## 2025-03-05 RX ORDER — SPIRONOLACTONE 25 MG/1
25 TABLET ORAL DAILY
Qty: 30 TABLET | Refills: 5 | Status: SHIPPED | OUTPATIENT
Start: 2025-03-05 | End: 2025-09-01

## 2025-03-05 NOTE — TELEPHONE ENCOUNTER
Called patient on the phone.     Discussed ER visit due to lip swelling in upper lip.   They said it was angioedema.   They discontinued lisinopril     Sodium was low at 128     140/70 recently.   Has had 130's   All under 140.       Current Regimen:   Hydrochlorothiazide 50mg  Metoprolol 50mg   Losartan       Now:   Discontinue hydrochlorothiazide due to hyponatremia  Discontinued amlodipine due to significant swelling     Will continue with   Metoprolol 50  Losartan     Start  Spironolactone 25 mg    Check bmp in 1 week   She is to check bp at home.    negative

## 2025-03-17 ENCOUNTER — CLINICAL SUPPORT (OUTPATIENT)
Dept: SLEEP MEDICINE | Facility: CLINIC | Age: 55
End: 2025-03-17
Payer: COMMERCIAL

## 2025-03-17 DIAGNOSIS — R51.9 BILATERAL HEADACHES: ICD-10-CM

## 2025-03-17 DIAGNOSIS — I10 ESSENTIAL HYPERTENSION: ICD-10-CM

## 2025-03-19 NOTE — PROGRESS NOTES
Type of Study: HOME SLEEP STUDY - NOMAD     The patient received equipment and instructions for use of the [employer] Nomad HSAT device. The patient was instructed how to apply the effort belts, cannula, thermistor. It was also explained how the Nomad and oximeter components work.  The patient was asked to record their sleep for an 8-hour period.     The patient was informed of their responsibility for the device and acknowledged this by signing the HSAT device contract. The patient was asked to return the device on 03/18/2025 by 10 AM to the Respiratory Care Department at [facility].     The patient was instructed to call 911 as usual for any medical- emergencies while at home.  The patient was also given a phone number for troubleshooting when using the device in case there were additional questions.

## 2025-03-19 NOTE — PROGRESS NOTES
Patient contacted for the second time on 03/19/2025 at 1452. Patient states she will return on 03/20/2025 on her way to work.

## 2025-03-19 NOTE — PROGRESS NOTES
Patient contacted about HSAT return by voicemail. Patient returned phone call to pulmonary clinic. And stated she did not wear device and would return device on 03/18/2025. Will attempt to reach out to patient again  on 03/19/2025

## 2025-03-21 NOTE — PROGRESS NOTES
Patient had an inconclusive HSAT sleep study due to patient unable to use the recording unit.    This patient returned the HSAT equipment unused. No data.     Patient should be rescheduled for repeat attempt of HSAT through [facility] for her convenience, or if appropriate, an in-lab sleep study.

## 2025-03-25 ENCOUNTER — TELEPHONE (OUTPATIENT)
Dept: PRIMARY CARE | Facility: CLINIC | Age: 55
End: 2025-03-25
Payer: COMMERCIAL

## 2025-03-25 NOTE — TELEPHONE ENCOUNTER
Patient is going through a really difficult time right now and would like to talk to someone professionally.  Is there someone you would recommend for her?

## 2025-03-27 NOTE — TELEPHONE ENCOUNTER
This is a great source;     [facility]   Address: [address], [place] [address]  Phone: [phone]  [email or web address]

## 2025-04-15 ENCOUNTER — TELEPHONE (OUTPATIENT)
Dept: PRIMARY CARE | Facility: CLINIC | Age: 55
End: 2025-04-15
Payer: COMMERCIAL

## 2025-04-15 DIAGNOSIS — I10 ESSENTIAL HYPERTENSION: Primary | ICD-10-CM

## 2025-04-15 NOTE — TELEPHONE ENCOUNTER
Side effects from Losartan.  Cough, diarrhea and runny nose.  Was told Losartan was being pulled from the market because of it being linked to cancer.  Please call.

## 2025-04-16 RX ORDER — VALSARTAN 160 MG/1
160 TABLET ORAL DAILY
Qty: 100 TABLET | Refills: 3 | Status: SHIPPED | OUTPATIENT
Start: 2025-04-16 | End: 2026-05-21

## 2025-04-16 NOTE — TELEPHONE ENCOUNTER
"How has blood pressure been? We can switch from losartan to valsartan or another in the class.   I am unfamiliar with claims of losartan and links to cancer. I am going to look into this deeper to ensure I am not missing anything.     Further investigation:     TLDR: This was a previous concern due to the ; appropriate recalls were made. Medication is now safe.     Background of the Concern:  In 6571-3889, certain batches of losartan and other \"sartan\" medications (like valsartan and irbesartan) were recalled due to contamination with nitrosamines (specifically NDMA and NDEA), which are potentially cancer-causing impurities.    These impurities were not part of the medication itself, but rather byproducts of the manufacturing process at certain overseas facilities.    Nitrosamines are classified as probable human carcinogens by agencies like the International Agency for Research on Cancer (IARC).    Key Points:  Losartan itself is not considered carcinogenic.    The risk came from specific contaminated lots, not from losartan as a drug class.    The FDA monitored and issued recalls to remove affected products from the market.    Since then, manufacturing standards have been updated to prevent future contamination.  "

## 2025-04-24 ENCOUNTER — APPOINTMENT (OUTPATIENT)
Dept: PRIMARY CARE | Facility: CLINIC | Age: 55
End: 2025-04-24
Payer: COMMERCIAL

## 2025-05-05 ENCOUNTER — TELEPHONE (OUTPATIENT)
Dept: PRIMARY CARE | Facility: CLINIC | Age: 55
End: 2025-05-05
Payer: COMMERCIAL

## 2025-05-05 ENCOUNTER — PATIENT OUTREACH (OUTPATIENT)
Dept: PRIMARY CARE | Facility: CLINIC | Age: 55
End: 2025-05-05
Payer: COMMERCIAL

## 2025-05-05 DIAGNOSIS — I1A.0 RESISTANT HYPERTENSION: Primary | ICD-10-CM

## 2025-05-05 NOTE — TELEPHONE ENCOUNTER
Pt called, lvm, her BP today is 180/112, it has been high all weekend. Pt is taking Metroprolol 50mg, Valsartan 160mg, and Spironolactone 25mg. Please advise what pt should do.

## 2025-05-06 NOTE — TELEPHONE ENCOUNTER
Let's increase spironolactone to 50mg   Take 2 tablets of wha tyou have.   I am going to place a referral to cardiology since you have had some fluctuating blood pressures that have been hard to manage / control.

## 2025-05-06 NOTE — TELEPHONE ENCOUNTER
Patient notified with understanding.  Given phone number for  to schedule cardiology appointment.  Advised to let us know how BP respond to the increase in medication.

## 2025-05-19 PROBLEM — I67.1 ANEURYSM OF ANTERIOR COMMUNICATING ARTERY (HHS-HCC): Status: ACTIVE | Noted: 2025-05-19

## 2025-05-20 ENCOUNTER — OFFICE VISIT (OUTPATIENT)
Dept: CARDIOLOGY | Facility: CLINIC | Age: 55
End: 2025-05-20
Payer: COMMERCIAL

## 2025-05-20 VITALS
HEART RATE: 71 BPM | HEIGHT: 68 IN | TEMPERATURE: 97.3 F | DIASTOLIC BLOOD PRESSURE: 102 MMHG | BODY MASS INDEX: 28.49 KG/M2 | SYSTOLIC BLOOD PRESSURE: 185 MMHG | WEIGHT: 188 LBS

## 2025-05-20 DIAGNOSIS — I67.1 ANEURYSM OF ANTERIOR COMMUNICATING ARTERY (HHS-HCC): Primary | ICD-10-CM

## 2025-05-20 DIAGNOSIS — I1A.0 RESISTANT HYPERTENSION: ICD-10-CM

## 2025-05-20 PROBLEM — I10 ESSENTIAL HYPERTENSION: Status: RESOLVED | Noted: 2020-02-11 | Resolved: 2025-05-20

## 2025-05-20 PROCEDURE — 3008F BODY MASS INDEX DOCD: CPT | Performed by: INTERNAL MEDICINE

## 2025-05-20 PROCEDURE — 99204 OFFICE O/P NEW MOD 45 MIN: CPT | Performed by: INTERNAL MEDICINE

## 2025-05-20 PROCEDURE — 1036F TOBACCO NON-USER: CPT | Performed by: INTERNAL MEDICINE

## 2025-05-20 PROCEDURE — 3080F DIAST BP >= 90 MM HG: CPT | Performed by: INTERNAL MEDICINE

## 2025-05-20 PROCEDURE — 99202 OFFICE O/P NEW SF 15 MIN: CPT

## 2025-05-20 PROCEDURE — 99202 OFFICE O/P NEW SF 15 MIN: CPT | Performed by: INTERNAL MEDICINE

## 2025-05-20 PROCEDURE — 3077F SYST BP >= 140 MM HG: CPT | Performed by: INTERNAL MEDICINE

## 2025-05-20 RX ORDER — NEBIVOLOL 5 MG/1
5 TABLET ORAL DAILY
Qty: 30 TABLET | Refills: 11 | Status: SHIPPED | OUTPATIENT
Start: 2025-05-20 | End: 2026-05-20

## 2025-05-20 RX ORDER — VALSARTAN AND HYDROCHLOROTHIAZIDE 320; 25 MG/1; MG/1
1 TABLET, FILM COATED ORAL DAILY
Qty: 30 TABLET | Refills: 11 | Status: SHIPPED | OUTPATIENT
Start: 2025-05-20 | End: 2026-05-20

## 2025-05-20 NOTE — PROGRESS NOTES
Chief Complaint:   Hypertension     History of Present Illness     Tess Grant is a 54 y.o. female I had the pleasure of evaluating in cardiology consultation with resistant hypertension since she presented 1/27/25 with severe HA, hypertensive emergency and Dx EDMUND aneurysm s/p coiling 1/30/25.  HTN x 5 years controlled with metoprolol and lisinopril and amlodipine added at discharge. Developed cough and ACE stopped and ARB started.  Also states amlodipine stopped due to edema.  Aldactone started.  Cough resolved.  Checks home - BP still 170's/90's. No headache, CP.  Dizziness and dyspnea.  No cardiac Hx.  Social EtOH. No Hx TY.  Follows low salt diet.       Previous History     Past Medical History:  She has a past medical history of Aneurysm of anterior communicating artery ([facility]) (05/19/2025), COVID-19 (10/06/2022), Other conditions influencing health status (12/05/2019), Personal history of other diseases of the respiratory system (12/05/2019), Personal history of other diseases of the respiratory system (03/25/2019), and Resistant hypertension (05/20/2025).    Past Surgical History:  She has a past surgical history that includes Other surgical history (10/06/2022); Other surgical history (10/06/2022); Other surgical history (10/06/2022); and Other surgical history (10/06/2022).      Social History:  She reports that she quit smoking about 7 years ago. Her smoking use included cigarettes. She started smoking about 22 years ago. She has a 7.5 pack-year smoking history. She has been exposed to tobacco smoke. She has quit using smokeless tobacco. She reports current alcohol use of about 2.0 standard drinks of alcohol per week. She reports that she does not use drugs.    Family History:  Family History[1]     Allergies:  Lisinopril    Outpatient Medications:  Current Outpatient Medications   Medication Instructions    cholecalciferol (VITAMIN D-3) 50 mcg, Daily    escitalopram (LEXAPRO) 20 mg, oral, Daily     "hydrOXYzine HCL (ATARAX) 25 mg, oral, Every 4 hours PRN    metoprolol succinate XL (TOPROL-XL) 50 mg, oral, Daily, Do not crush or chew.    spironolactone (ALDACTONE) 25 mg, oral, Daily    valACYclovir (VALTREX) 500 mg, oral, Daily    valsartan (DIOVAN) 160 mg, oral, Daily       Physical Examination   Vitals:  Visit Vitals  BP (!) 185/102   Pulse 71   Temp 36.3 °C (97.3 °F)   Ht 1.715 m (5' 7.5\")   Wt 85.3 kg (188 lb)   BMI 29.01 kg/m²   OB Status Postmenopausal   Smoking Status Former   BSA 2.02 m²    Physical Exam  Vitals reviewed.   Constitutional:       General: She is not in acute distress.     Appearance: Normal appearance.   HENT:      Head: Normocephalic and atraumatic.      Nose: Nose normal.   Eyes:      Conjunctiva/sclera: Conjunctivae normal.   Cardiovascular:      Rate and Rhythm: Normal rate and regular rhythm.      Pulses: Normal pulses.      Heart sounds: No murmur heard.  Pulmonary:      Effort: Pulmonary effort is normal. No respiratory distress.      Breath sounds: Normal breath sounds. No wheezing, rhonchi or rales.   Abdominal:      General: Bowel sounds are normal. There is no distension.      Palpations: Abdomen is soft.      Tenderness: There is no abdominal tenderness.   Musculoskeletal:         General: No swelling.      Right lower leg: No edema.      Left lower leg: No edema.   Skin:     General: Skin is warm and dry.      Capillary Refill: Capillary refill takes less than 2 seconds.   Neurological:      General: No focal deficit present.      Mental Status: She is alert.   Psychiatric:         Mood and Affect: Mood normal.             Labs/Imaging/Cardiac Studies   I have personally reviewed the patient's available lab work, primary care appointment notes, pertinent imaging studies, and cardiac studies and have discussed them and my independent interpretation of those results with the patient and caregiver at this appointment.  All pertinent recent Emergency Department evaluations and " Hospital admissions were also reviewed in detail with the patient and caregiver.    Reviewed hospital records, Labs, Echo, CT chest  Reviewed literature - HTN should not become severe with a non-ruptured EDMUND (no SAH).    Echo:  No echocardiogram results found for the past 12 months       Assessment and Recommendations     Assessment/Plan       1. Resistant hypertension  Suggest stop spironolactone and add hydrochlorothiazide + Increase ARB.   Change to Bystolic.  - Referral to Cardiology    2. Aneurysm of anterior communicating artery (HHS-HCC) (Primary)  Stable s/p coil embolization.           Parviz Louise MD    Exclusive of any other services or procedures performed, I, Parviz Louise MD , spent 30 minutes in duration for this visit today.  This time consisted of chart review, obtaining history, and/or performing the exam as documented above as well as documenting the clinical information for the encounter in the electronic record, discussing treatment options, plans, and/or goals with patient, family, and/or caregiver, refilling medications, updating the electronic record, ordering medicines, lab work, imaging, referrals, and/or procedures as documented above and communicating with other [facility] professionals. I have discussed the results of laboratory, radiology, and cardiology studies with the patient and their family/caregiver.         [1]   Family History  Problem Relation Name Age of Onset    Hyperlipidemia Mother      Hypertension Father      Hypertension Other maternal relatives

## 2025-05-27 DIAGNOSIS — I67.1 ANEURYSM OF ANTERIOR COMMUNICATING ARTERY (HHS-HCC): ICD-10-CM

## 2025-05-27 DIAGNOSIS — I1A.0 RESISTANT HYPERTENSION: ICD-10-CM

## 2025-06-24 ENCOUNTER — OFFICE VISIT (OUTPATIENT)
Dept: CARDIOLOGY | Facility: CLINIC | Age: 55
End: 2025-06-24
Payer: COMMERCIAL

## 2025-06-24 VITALS
SYSTOLIC BLOOD PRESSURE: 139 MMHG | BODY MASS INDEX: 29.01 KG/M2 | HEART RATE: 79 BPM | WEIGHT: 188 LBS | DIASTOLIC BLOOD PRESSURE: 78 MMHG

## 2025-06-24 DIAGNOSIS — I67.1 ANEURYSM OF ANTERIOR COMMUNICATING ARTERY (HHS-HCC): ICD-10-CM

## 2025-06-24 DIAGNOSIS — I1A.0 RESISTANT HYPERTENSION: ICD-10-CM

## 2025-06-24 PROCEDURE — 1036F TOBACCO NON-USER: CPT | Performed by: INTERNAL MEDICINE

## 2025-06-24 PROCEDURE — 99213 OFFICE O/P EST LOW 20 MIN: CPT | Performed by: INTERNAL MEDICINE

## 2025-06-24 PROCEDURE — 3075F SYST BP GE 130 - 139MM HG: CPT | Performed by: INTERNAL MEDICINE

## 2025-06-24 PROCEDURE — 99212 OFFICE O/P EST SF 10 MIN: CPT

## 2025-06-24 PROCEDURE — 3078F DIAST BP <80 MM HG: CPT | Performed by: INTERNAL MEDICINE

## 2025-06-24 NOTE — PROGRESS NOTES
Chief Complaint:   Hypertension     History of Present Illness     Tess Grant is a 54 y.o. female I had the pleasure of evaluating in cardiology consultation with resistant hypertension since she presented 1/27/25 with severe HA, hypertensive emergency and Dx EDMUND aneurysm s/p coiling 1/30/25.  HTN x 5 years controlled with metoprolol and lisinopril and amlodipine added at discharge. Developed cough and ACE stopped and ARB started.  Also states amlodipine stopped due to edema.  Aldactone started.  Cough resolved.  Checks home - BP still 170's/90's. No headache, CP.  Dizziness and dyspnea.  No cardiac Hx.  Social EtOH. No Hx TY.  Follows low salt diet.  Since here 5/20/25, BP excellent and feels great.  No CP or HOFFMANN.  Exercising.       Previous History     Past Medical History:  She has a past medical history of Aneurysm of anterior communicating artery ([facility]) (05/19/2025), COVID-19 (10/06/2022), Other conditions influencing health status (12/05/2019), Personal history of other diseases of the respiratory system (12/05/2019), Personal history of other diseases of the respiratory system (03/25/2019), and Resistant hypertension (05/20/2025).    Past Surgical History:  She has a past surgical history that includes Other surgical history (10/06/2022); Other surgical history (10/06/2022); Other surgical history (10/06/2022); and Other surgical history (10/06/2022).      Social History:  She reports that she quit smoking about 7 years ago. Her smoking use included cigarettes. She started smoking about 22 years ago. She has a 7.5 pack-year smoking history. She has been exposed to tobacco smoke. She has quit using smokeless tobacco. She reports current alcohol use of about 2.0 standard drinks of alcohol per week. She reports that she does not use drugs.    Family History:  Family History[1]     Allergies:  Lisinopril    Outpatient Medications:  Current Outpatient Medications   Medication Instructions     cholecalciferol (VITAMIN D-3) 50 mcg, Daily    escitalopram (LEXAPRO) 20 mg, oral, Daily    hydrOXYzine HCL (ATARAX) 25 mg, oral, Every 4 hours PRN    nebivolol (BYSTOLIC) 5 mg, oral, Daily    valACYclovir (VALTREX) 500 mg, oral, Daily    valsartan-hydrochlorothiazide (Diovan HCT) 320-25 mg tablet 1 tablet, oral, Daily       Physical Examination   Vitals:  Visit Vitals  /78 (BP Location: Right arm, Patient Position: Sitting, BP Cuff Size: Adult)   Pulse 79   Wt 85.3 kg (188 lb)   BMI 29.01 kg/m²   OB Status Postmenopausal   Smoking Status Former   BSA 2.02 m²    Physical Exam  Vitals reviewed.   Constitutional:       General: She is not in acute distress.     Appearance: Normal appearance.   HENT:      Head: Normocephalic and atraumatic.      Nose: Nose normal.   Eyes:      Conjunctiva/sclera: Conjunctivae normal.   Cardiovascular:      Rate and Rhythm: Normal rate and regular rhythm.      Pulses: Normal pulses.      Heart sounds: No murmur heard.  Pulmonary:      Effort: Pulmonary effort is normal. No respiratory distress.      Breath sounds: Normal breath sounds. No wheezing, rhonchi or rales.   Abdominal:      General: Bowel sounds are normal. There is no distension.      Palpations: Abdomen is soft.      Tenderness: There is no abdominal tenderness.   Musculoskeletal:         General: No swelling.      Right lower leg: No edema.      Left lower leg: No edema.   Skin:     General: Skin is warm and dry.      Capillary Refill: Capillary refill takes less than 2 seconds.   Neurological:      General: No focal deficit present.      Mental Status: She is alert.   Psychiatric:         Mood and Affect: Mood normal.             Labs/Imaging/Cardiac Studies   I have personally reviewed the patient's available lab work, primary care appointment notes, pertinent imaging studies, and cardiac studies and have discussed them and my independent interpretation of those results with the patient and caregiver at this  appointment.  All pertinent recent Emergency Department evaluations and Hospital admissions were also reviewed in detail with the patient and caregiver.      Echo:  No echocardiogram results found for the past 12 months       Assessment and Recommendations     Assessment/Plan       1. Resistant hypertension  Now well controlled on medical regimen.  - Referral to Cardiology    2. Aneurysm of anterior communicating artery (HHS-HCC) (Primary)  Stable s/p coil embolization.     Follow up prn.  BMP now      Parviz Louise MD    Exclusive of any other services or procedures performed, I, Parviz Louise MD , spent 30 minutes in duration for this visit today.  This time consisted of chart review, obtaining history, and/or performing the exam as documented above as well as documenting the clinical information for the encounter in the electronic record, discussing treatment options, plans, and/or goals with patient, family, and/or caregiver, refilling medications, updating the electronic record, ordering medicines, lab work, imaging, referrals, and/or procedures as documented above and communicating with other [facility]. I have discussed the results of laboratory, radiology, and cardiology studies with the patient and their family/caregiver.         [1]   Family History  Problem Relation Name Age of Onset    Hyperlipidemia Mother      Hypertension Father      Hypertension Other maternal relatives

## 2025-06-25 LAB
ANION GAP SERPL CALCULATED.4IONS-SCNC: 15 MMOL/L (CALC) (ref 7–17)
BUN SERPL-MCNC: 19 MG/DL (ref 7–25)
BUN/CREAT SERPL: NORMAL (CALC) (ref 6–22)
CALCIUM SERPL-MCNC: 9.5 MG/DL (ref 8.6–10.4)
CHLORIDE SERPL-SCNC: 103 MMOL/L (ref 98–110)
CO2 SERPL-SCNC: 20 MMOL/L (ref 20–32)
CREAT SERPL-MCNC: 0.61 MG/DL (ref 0.5–1.03)
EGFRCR SERPLBLD CKD-EPI 2021: 108 ML/MIN/1.73M2
GLUCOSE SERPL-MCNC: 70 MG/DL (ref 65–99)
POTASSIUM SERPL-SCNC: 3.8 MMOL/L (ref 3.5–5.3)
SODIUM SERPL-SCNC: 138 MMOL/L (ref 135–146)

## 2025-07-14 ENCOUNTER — APPOINTMENT (OUTPATIENT)
Dept: PRIMARY CARE | Facility: CLINIC | Age: 55
End: 2025-07-14
Payer: COMMERCIAL